# Patient Record
Sex: MALE | Race: WHITE | URBAN - METROPOLITAN AREA
[De-identification: names, ages, dates, MRNs, and addresses within clinical notes are randomized per-mention and may not be internally consistent; named-entity substitution may affect disease eponyms.]

---

## 2024-12-17 ENCOUNTER — OFFICE VISIT (OUTPATIENT)
Dept: URGENT CARE | Age: 49
End: 2024-12-17
Payer: COMMERCIAL

## 2024-12-17 VITALS
DIASTOLIC BLOOD PRESSURE: 94 MMHG | HEART RATE: 76 BPM | SYSTOLIC BLOOD PRESSURE: 138 MMHG | RESPIRATION RATE: 18 BRPM | WEIGHT: 180 LBS | TEMPERATURE: 98.5 F | OXYGEN SATURATION: 99 %

## 2024-12-17 DIAGNOSIS — H10.33 ACUTE BACTERIAL CONJUNCTIVITIS OF BOTH EYES: Primary | ICD-10-CM

## 2024-12-17 PROCEDURE — 99203 OFFICE O/P NEW LOW 30 MIN: CPT | Performed by: EMERGENCY MEDICINE

## 2024-12-17 RX ORDER — TOBRAMYCIN 3 MG/ML
2 SOLUTION/ DROPS OPHTHALMIC EVERY 4 HOURS
Qty: 5 ML | Refills: 0 | Status: SHIPPED | OUTPATIENT
Start: 2024-12-17 | End: 2024-12-22

## 2024-12-17 ASSESSMENT — ENCOUNTER SYMPTOMS
SINUS COMPLAINT: 1
EYE DISCHARGE: 1

## 2024-12-17 NOTE — PROGRESS NOTES
Subjective   Patient ID: Shola Antonio is a 49 y.o. male. They present today with a chief complaint of Sinus Problem (SINUS 3 WEEKS / PINK EYE LAST NIGHT ).    History of Present Illness    Sinus Problem    This is a 49-year-old male presents today complaining of bilateral eye discharge onset this morning.  States he has been fighting nasal congestion for the past 3 weeks.  He denies any fever or chills.  Denies any nausea or vomiting.  Denies any blurred vision or double vision.  Past Medical History  Allergies as of 12/17/2024    (No Known Allergies)       (Not in a hospital admission)       History reviewed. No pertinent past medical history.    History reviewed. No pertinent surgical history.     reports that he has an unknown smoking status. He has been exposed to tobacco smoke. He does not have any smokeless tobacco history on file. Alcohol use questions deferred to the physician. Drug use questions deferred to the physician.    Review of Systems  Review of Systems   Eyes:  Positive for discharge.   All other systems reviewed and are negative.                                 Objective    Vitals:    12/17/24 1120   BP: (!) 138/94   Pulse: 76   Resp: 18   Temp: 36.9 °C (98.5 °F)   TempSrc: Oral   SpO2: 99%   Weight: 81.6 kg (180 lb)     No LMP for male patient.    Physical Exam  The patient is awake alert oriented x 3 in no acute distress vital signs are stable.  Conjunctiva mildly injected.  There was some crusted drainage noted over the eyebrows.  Nares slightly congested there was no frontal or maxillary sinus tenderness to percussion.  Throat is clear.  Neck supple.  Procedures    Point of Care Test & Imaging Results from this visit  No results found for this visit on 12/17/24.   No results found.    Diagnostic study results (if any) were reviewed by Luther Montana DO.    Assessment/Plan   Allergies, medications, history, and pertinent labs/EKGs/Imaging reviewed by Luther Montana DO.      Medical Decision Making      Orders and Diagnoses  Diagnoses and all orders for this visit:  Acute bacterial conjunctivitis of both eyes  -     tobramycin (Tobrex) 0.3 % ophthalmic solution; Administer 2 drops into both eyes every 4 hours for 5 days.      Medical Admin Record      Patient disposition: Home    Electronically signed by Luther Montana DO  12:01 PM